# Patient Record
Sex: FEMALE | Race: ASIAN | Employment: STUDENT | URBAN - METROPOLITAN AREA
[De-identification: names, ages, dates, MRNs, and addresses within clinical notes are randomized per-mention and may not be internally consistent; named-entity substitution may affect disease eponyms.]

---

## 2024-10-02 ENCOUNTER — LAB ENCOUNTER (OUTPATIENT)
Dept: LAB | Age: 29
End: 2024-10-02
Attending: INTERNAL MEDICINE
Payer: COMMERCIAL

## 2024-10-02 DIAGNOSIS — K92.1 MELENA: ICD-10-CM

## 2024-10-02 LAB — HGB BLD-MCNC: 10.1 G/DL

## 2024-10-02 PROCEDURE — 36415 COLL VENOUS BLD VENIPUNCTURE: CPT

## 2024-10-02 PROCEDURE — 85018 HEMOGLOBIN: CPT

## 2024-10-15 ENCOUNTER — HOSPITAL ENCOUNTER (EMERGENCY)
Age: 29
Discharge: HOME | End: 2024-10-15
Attending: EMERGENCY MEDICINE
Payer: COMMERCIAL

## 2024-10-15 ENCOUNTER — APPOINTMENT (OUTPATIENT)
Dept: MRI IMAGING | Age: 29
End: 2024-10-15
Attending: EMERGENCY MEDICINE
Payer: COMMERCIAL

## 2024-10-15 VITALS
RESPIRATION RATE: 16 BRPM | OXYGEN SATURATION: 99 % | WEIGHT: 178.56 LBS | DIASTOLIC BLOOD PRESSURE: 68 MMHG | HEIGHT: 63 IN | TEMPERATURE: 98 F | SYSTOLIC BLOOD PRESSURE: 100 MMHG | BODY MASS INDEX: 31.64 KG/M2 | HEART RATE: 66 BPM

## 2024-10-15 DIAGNOSIS — H53.8 BLURRY VISION, RIGHT EYE: Primary | ICD-10-CM

## 2024-10-15 DIAGNOSIS — H57.11 PAIN OF RIGHT ORBIT: ICD-10-CM

## 2024-10-15 DIAGNOSIS — R51.9 NONINTRACTABLE HEADACHE, UNSPECIFIED CHRONICITY PATTERN, UNSPECIFIED HEADACHE TYPE: ICD-10-CM

## 2024-10-15 LAB
ALBUMIN SERPL-MCNC: 3.6 G/DL (ref 3.4–5)
ALBUMIN/GLOB SERPL: 0.9 {RATIO} (ref 1–2)
ALP LIVER SERPL-CCNC: 69 U/L
ALT SERPL-CCNC: 23 U/L
ANION GAP SERPL CALC-SCNC: 5 MMOL/L (ref 0–18)
AST SERPL-CCNC: 16 U/L (ref 15–37)
BASOPHILS # BLD AUTO: 0.03 X10(3) UL (ref 0–0.2)
BASOPHILS NFR BLD AUTO: 0.4 %
BILIRUB SERPL-MCNC: 0.3 MG/DL (ref 0.1–2)
BUN BLD-MCNC: 8 MG/DL (ref 9–23)
CALCIUM BLD-MCNC: 8.9 MG/DL (ref 8.5–10.1)
CHLORIDE SERPL-SCNC: 106 MMOL/L (ref 98–112)
CO2 SERPL-SCNC: 25 MMOL/L (ref 21–32)
CREAT BLD-MCNC: 0.65 MG/DL
EGFRCR SERPLBLD CKD-EPI 2021: 124 ML/MIN/1.73M2 (ref 60–?)
EOSINOPHIL # BLD AUTO: 0.07 X10(3) UL (ref 0–0.7)
EOSINOPHIL NFR BLD AUTO: 0.9 %
ERYTHROCYTE [DISTWIDTH] IN BLOOD BY AUTOMATED COUNT: 13.1 %
GLOBULIN PLAS-MCNC: 3.8 G/DL (ref 2.8–4.4)
GLUCOSE BLD-MCNC: 105 MG/DL (ref 70–99)
HCT VFR BLD AUTO: 33.3 %
HGB BLD-MCNC: 11.2 G/DL
IMM GRANULOCYTES # BLD AUTO: 0.02 X10(3) UL (ref 0–1)
IMM GRANULOCYTES NFR BLD: 0.2 %
LYMPHOCYTES # BLD AUTO: 2.66 X10(3) UL (ref 1–4)
LYMPHOCYTES NFR BLD AUTO: 33.1 %
MCH RBC QN AUTO: 29.3 PG (ref 26–34)
MCHC RBC AUTO-ENTMCNC: 33.6 G/DL (ref 31–37)
MCV RBC AUTO: 87.2 FL
MONOCYTES # BLD AUTO: 0.67 X10(3) UL (ref 0.1–1)
MONOCYTES NFR BLD AUTO: 8.3 %
NEUTROPHILS # BLD AUTO: 4.59 X10 (3) UL (ref 1.5–7.7)
NEUTROPHILS # BLD AUTO: 4.59 X10(3) UL (ref 1.5–7.7)
NEUTROPHILS NFR BLD AUTO: 57.1 %
OSMOLALITY SERPL CALC.SUM OF ELEC: 281 MOSM/KG (ref 275–295)
PLATELET # BLD AUTO: 373 10(3)UL (ref 150–450)
POTASSIUM SERPL-SCNC: 3.8 MMOL/L (ref 3.5–5.1)
PROT SERPL-MCNC: 7.4 G/DL (ref 6.4–8.2)
RBC # BLD AUTO: 3.82 X10(6)UL
SODIUM SERPL-SCNC: 136 MMOL/L (ref 136–145)
WBC # BLD AUTO: 8 X10(3) UL (ref 4–11)

## 2024-10-15 PROCEDURE — 99284 EMERGENCY DEPT VISIT MOD MDM: CPT

## 2024-10-15 PROCEDURE — 36415 COLL VENOUS BLD VENIPUNCTURE: CPT

## 2024-10-15 PROCEDURE — A9575 INJ GADOTERATE MEGLUMI 0.1ML: HCPCS | Performed by: EMERGENCY MEDICINE

## 2024-10-15 PROCEDURE — 70553 MRI BRAIN STEM W/O & W/DYE: CPT | Performed by: EMERGENCY MEDICINE

## 2024-10-15 PROCEDURE — 70543 MRI ORBT/FAC/NCK W/O &W/DYE: CPT | Performed by: EMERGENCY MEDICINE

## 2024-10-15 PROCEDURE — 80053 COMPREHEN METABOLIC PANEL: CPT | Performed by: EMERGENCY MEDICINE

## 2024-10-15 PROCEDURE — 85025 COMPLETE CBC W/AUTO DIFF WBC: CPT | Performed by: EMERGENCY MEDICINE

## 2024-10-15 RX ORDER — GADOTERATE MEGLUMINE 376.9 MG/ML
17 INJECTION INTRAVENOUS
Status: COMPLETED | OUTPATIENT
Start: 2024-10-15 | End: 2024-10-15

## 2024-10-15 RX ORDER — BUTALBITAL, ACETAMINOPHEN AND CAFFEINE 50; 325; 40 MG/1; MG/1; MG/1
1-2 TABLET ORAL EVERY 6 HOURS PRN
Qty: 10 TABLET | Refills: 0 | Status: SHIPPED | OUTPATIENT
Start: 2024-10-15 | End: 2024-10-20

## 2024-10-15 NOTE — ED INITIAL ASSESSMENT (HPI)
Pt reports rt eye pain, headache and tearing with blurry vision since yesterday. Denies injury to eye or recent illness.

## 2024-10-15 NOTE — ED PROVIDER NOTES
Patient Seen in: [facility] In [place]      History     Chief Complaint   Patient presents with    Eye Pain     Stated Complaint: rt eye pain and tearing, blurry vision since yesterday    Subjective:   HPI      Patient is a 28-year-old female presents with her significant other.  Complaints of right eye pain rating behind the orbit.  Blurred vision since yesterday.  Increased watering her eyes per patient.  No discharge.  No trauma.  No foreign body sensation.  Mild light sensitivity.  No history of similar symptoms.  Patient is otherwise at her medical baseline.  No other focal numbness or weakness.  Headache is focal involving the right orbit    Objective:     Past Medical History:    Black stools    Bloating              History reviewed. No pertinent surgical history.             Social History     Socioeconomic History    Marital status:                   Physical Exam     ED Triage Vitals [10/15/24 1535]   /73   Pulse 70   Resp 16   Temp 97.8 °F (36.6 °C)   Temp src Temporal   SpO2 100 %   O2 Device None (Room air)       Current Vitals:   Vital Signs  BP: 110/73  Pulse: 70  Resp: 16  Temp: 97.8 °F (36.6 °C)  Temp src: Temporal    Oxygen Therapy  SpO2: 100 %  O2 Device: None (Room air)        Physical Exam  General: Well-appearing patient of stated age resting comfortably  HEENT: Normocephalic atraumatic.  Nonicteric sclera.  Moist mucous membranes.  Pupils intact and reactive.  See nurses notes for visual acuity.  No conjunctival erythema or discharge.  Extraocular muscles are intact  Lungs: No tachypnea  Cardiac: No tachycardia  Skin: No rashes, pallor  Neuro: No focal deficits.  Normal speech.  Normal gait.  Nonfocal.  Extremities: No cyanosis/edema    ED Course     Labs Reviewed   COMP METABOLIC PANEL (14) - Abnormal; Notable for the following components:       Result Value    Glucose 105 (*)     BUN 8 (*)     A/G Ratio 0.9 (*)     All other components within normal limits    CBC WITH DIFFERENTIAL WITH PLATELET - Abnormal; Notable for the following components:    HGB 11.2 (*)     HCT 33.3 (*)     All other components within normal limits        MRI of the orbits and brain: No signs of mass or optic neuritis mildly prominent right lacrimal gland.    Electrolytes noted and normal.  White count 8.       MDM      Patient presents with right orbital pain, complaints of blurred vision.  Visual acuity is intact here.  Pupils appear normal.  Differential includes sinusitis, optic neuritis, orbital mass, other.  Discussed options with patient.  Will proceed to MRI of the head and orbit for further evaluation with and without contrast.  Will check lab work.  Will reassess after    Discussed results with patient's significant other.  No signs of optic neuritis or the pathology.  Unclear etiology.  Further discussion says she has had these headaches since she came from the [facility].  Initially was treated with ibuprofen which caused an ulcer.  She been out medications.  Possible atypical migraines, other.  She is appears well here.  Will start short supply Fioricet.  Will refer to both ophthalmology and neurology.  Return if worsening symptoms, new complaints.    Medical Decision Making      Disposition and Plan     Clinical Impression:  1. Blurry vision, right eye    2. Pain of right orbit    3. Nonintractable headache, unspecified chronicity pattern, unspecified headache type    #4.  Prominent right lacrimal gland    Disposition:  Discharge  10/15/2024  7:06 pm    Follow-up:  Critical access hospital EYE [facility]  [address] [facility] [id number]  Follow up in 2 day(s)      Tano Garcia MD  [address]  [facility] [address] [phone]    Follow up in 1 week(s)      [address]  [address]  [facility] [address] [phone]  Follow up in 1 week(s)            Medications Prescribed:  Current Discharge  Medication List        START taking these medications    Details   butalbital-acetaminophen-caffeine -40 MG Oral Tab Take 1-2 tablets by mouth every 6 (six) hours as needed for Pain.  Qty: 10 tablet, Refills: 0    Associated Diagnoses: Nonintractable headache, unspecified chronicity pattern, unspecified headache type                 Supplementary Documentation:

## 2024-10-16 NOTE — DISCHARGE INSTRUCTIONS
Your MRI was essentially normal.  Mild increase size of your right lacrimal gland.  Can follow-up with ophthalmology, primary care.  No findings to explain your symptoms.  Would follow-up with ophthalmology for repeat exam.  Follow-up with primary care.  Return if worsening symptoms, new complaints.